# Patient Record
Sex: MALE | Race: WHITE
[De-identification: names, ages, dates, MRNs, and addresses within clinical notes are randomized per-mention and may not be internally consistent; named-entity substitution may affect disease eponyms.]

---

## 2018-03-03 ENCOUNTER — HOSPITAL ENCOUNTER (EMERGENCY)
Dept: HOSPITAL 74 - JERFT | Age: 33
Discharge: HOME | End: 2018-03-03
Payer: COMMERCIAL

## 2018-03-03 VITALS — HEART RATE: 103 BPM | DIASTOLIC BLOOD PRESSURE: 86 MMHG | TEMPERATURE: 98.1 F | SYSTOLIC BLOOD PRESSURE: 146 MMHG

## 2018-03-03 VITALS — BODY MASS INDEX: 24 KG/M2

## 2018-03-03 DIAGNOSIS — F41.9: Primary | ICD-10-CM

## 2018-03-03 LAB
BASOPHILS # BLD: 0.7 % (ref 0–2)
DEPRECATED RDW RBC AUTO: 13.1 % (ref 11.9–15.9)
EOSINOPHIL # BLD: 0.3 % (ref 0–4.5)
HCT VFR BLD CALC: 48.9 % (ref 35.4–49)
HGB BLD-MCNC: 17.3 GM/DL (ref 11.7–16.9)
LYMPHOCYTES # BLD: 23.7 % (ref 8–40)
MCH RBC QN AUTO: 30.6 PG (ref 25.7–33.7)
MCHC RBC AUTO-ENTMCNC: 35.4 G/DL (ref 32–35.9)
MCV RBC: 86.3 FL (ref 80–96)
MONOCYTES # BLD AUTO: 6.6 % (ref 3.8–10.2)
NEUTROPHILS # BLD: 68.7 % (ref 42.8–82.8)
PLATELET # BLD AUTO: 222 K/MM3 (ref 134–434)
PMV BLD: 8.6 FL (ref 7.5–11.1)
RBC # BLD AUTO: 5.66 M/MM3 (ref 4–5.6)
WBC # BLD AUTO: 8.3 K/MM3 (ref 4–10)

## 2018-09-23 ENCOUNTER — HOSPITAL ENCOUNTER (EMERGENCY)
Dept: HOSPITAL 74 - JER | Age: 33
Discharge: HOME | End: 2018-09-23
Payer: COMMERCIAL

## 2018-09-23 VITALS — DIASTOLIC BLOOD PRESSURE: 70 MMHG | TEMPERATURE: 97.8 F | HEART RATE: 79 BPM | SYSTOLIC BLOOD PRESSURE: 129 MMHG

## 2018-09-23 VITALS — BODY MASS INDEX: 24.2 KG/M2

## 2018-09-23 DIAGNOSIS — R30.0: Primary | ICD-10-CM

## 2018-09-23 LAB
APPEARANCE UR: CLEAR
BILIRUB UR STRIP.AUTO-MCNC: NEGATIVE MG/DL
COLOR UR: COLORLESS
KETONES UR QL STRIP: NEGATIVE
LEUKOCYTE ESTERASE UR QL STRIP.AUTO: NEGATIVE
NITRITE UR QL STRIP: NEGATIVE
PH UR: 8 [PH] (ref 5–8)
PROT UR QL STRIP: NEGATIVE
PROT UR QL STRIP: NEGATIVE
SP GR UR: 1 (ref 1–1.03)
UROBILINOGEN UR STRIP-MCNC: NEGATIVE MG/DL (ref 0.2–1)

## 2019-05-13 ENCOUNTER — HOSPITAL ENCOUNTER (EMERGENCY)
Dept: HOSPITAL 74 - JER | Age: 34
Discharge: HOME | End: 2019-05-13
Payer: COMMERCIAL

## 2019-05-13 VITALS — DIASTOLIC BLOOD PRESSURE: 80 MMHG | HEART RATE: 124 BPM | SYSTOLIC BLOOD PRESSURE: 134 MMHG | TEMPERATURE: 98.9 F

## 2019-05-13 VITALS — BODY MASS INDEX: 23.6 KG/M2

## 2019-05-13 DIAGNOSIS — J02.9: ICD-10-CM

## 2019-05-13 DIAGNOSIS — B34.9: Primary | ICD-10-CM

## 2023-05-15 ENCOUNTER — APPOINTMENT (OUTPATIENT)
Dept: OTOLARYNGOLOGY | Facility: CLINIC | Age: 38
End: 2023-05-15
Payer: COMMERCIAL

## 2023-05-15 VITALS
HEIGHT: 70 IN | WEIGHT: 172 LBS | BODY MASS INDEX: 24.62 KG/M2 | DIASTOLIC BLOOD PRESSURE: 82 MMHG | RESPIRATION RATE: 16 BRPM | OXYGEN SATURATION: 98 % | HEART RATE: 76 BPM | SYSTOLIC BLOOD PRESSURE: 122 MMHG

## 2023-05-15 PROBLEM — Z00.00 ENCOUNTER FOR PREVENTIVE HEALTH EXAMINATION: Status: ACTIVE | Noted: 2023-05-15

## 2023-05-15 PROCEDURE — 99203 OFFICE O/P NEW LOW 30 MIN: CPT

## 2023-05-15 NOTE — REASON FOR VISIT
[Initial Evaluation] : an initial evaluation for [FreeTextEntry2] : Persistent bilateral neck discomfort for the past couple of weeks.  Patient states his level of severity is a level 6 out of 10 and it occurs constant.  Patient states his level of severity is a level 8 out of 10 and it occurs constant.  Patient states nothing helps to improve or worsens his Persistent bilateral neck discomfort for the past couple of weeks.

## 2023-05-15 NOTE — REVIEW OF SYSTEMS
[Patient Intake Form Reviewed] : Patient intake form was reviewed [As Noted in HPI] : as noted in HPI [Swelling Neck] : swelling neck [Negative] : Heme/Lymph [FreeTextEntry4] : Persistent bilateral neck discomfort for the past couple of weeks.

## 2023-05-15 NOTE — PHYSICAL EXAM
[de-identified] : small cervical adenopathy posterior bilateral  Skin folliculitis scalp [Normal] : no abnormal secretions

## 2023-05-15 NOTE — HISTORY OF PRESENT ILLNESS
[de-identified] : 38 years old male patient with history of Persistent bilateral neck discomfort for the past couple of weeks.  Patient is present today in the office with bilateral posterior cervical lymph adenopathy behind the muscle.  Patient reports history of scalp folliculitis for the past couple of months.

## 2023-06-08 ENCOUNTER — APPOINTMENT (OUTPATIENT)
Dept: OTOLARYNGOLOGY | Facility: CLINIC | Age: 38
End: 2023-06-08

## 2023-06-12 ENCOUNTER — APPOINTMENT (OUTPATIENT)
Dept: OTOLARYNGOLOGY | Facility: CLINIC | Age: 38
End: 2023-06-12
Payer: COMMERCIAL

## 2023-06-12 VITALS
BODY MASS INDEX: 25.2 KG/M2 | SYSTOLIC BLOOD PRESSURE: 124 MMHG | WEIGHT: 176 LBS | RESPIRATION RATE: 16 BRPM | OXYGEN SATURATION: 100 % | DIASTOLIC BLOOD PRESSURE: 80 MMHG | HEIGHT: 70 IN | TEMPERATURE: 98.2 F | HEART RATE: 72 BPM

## 2023-06-12 DIAGNOSIS — M54.2 CERVICALGIA: ICD-10-CM

## 2023-06-12 DIAGNOSIS — J34.2 DEVIATED NASAL SEPTUM: ICD-10-CM

## 2023-06-12 DIAGNOSIS — L73.9 FOLLICULAR DISORDER, UNSPECIFIED: ICD-10-CM

## 2023-06-12 DIAGNOSIS — J34.3 HYPERTROPHY OF NASAL TURBINATES: ICD-10-CM

## 2023-06-12 DIAGNOSIS — R59.0 LOCALIZED ENLARGED LYMPH NODES: ICD-10-CM

## 2023-06-12 PROCEDURE — 99213 OFFICE O/P EST LOW 20 MIN: CPT

## 2023-06-12 NOTE — REASON FOR VISIT
[Subsequent Evaluation] : a subsequent evaluation for [FreeTextEntry2] : Persistent bilateral neck discomfort for the past couple of weeks. Neck  US impression

## 2023-06-12 NOTE — HISTORY OF PRESENT ILLNESS
[de-identified] : 38 years old male patient with history of Persistent bilateral neck discomfort for the past couple of weeks.  Patient is present today in the office with bilateral posterior cervical lymph adenopathy behind the muscle.  Patient reports history of scalp folliculitis for the past couple of months. Neck  US impression inflammatory  process.  Patient will be observed for the next couple of months.

## 2023-06-12 NOTE — PHYSICAL EXAM
[Normal] : no rashes [de-identified] : small cervical adenopathy posterior bilateral  Skin folliculitis scalp